# Patient Record
Sex: FEMALE | URBAN - METROPOLITAN AREA
[De-identification: names, ages, dates, MRNs, and addresses within clinical notes are randomized per-mention and may not be internally consistent; named-entity substitution may affect disease eponyms.]

---

## 2021-07-15 ENCOUNTER — LAB OUTSIDE AN ENCOUNTER (OUTPATIENT)
Dept: URBAN - METROPOLITAN AREA CLINIC 113 | Facility: CLINIC | Age: 51
End: 2021-07-15

## 2021-07-15 ENCOUNTER — TELEPHONE ENCOUNTER (OUTPATIENT)
Dept: URBAN - METROPOLITAN AREA CLINIC 113 | Facility: CLINIC | Age: 51
End: 2021-07-15

## 2021-07-15 VITALS — BODY MASS INDEX: 23.95 KG/M2 | WEIGHT: 158 LBS | HEIGHT: 68 IN

## 2021-07-15 RX ORDER — SODIUM, POTASSIUM,MAG SULFATES 17.5-3.13G
354ML SOLUTION, RECONSTITUTED, ORAL ORAL
Qty: 354 MILLILITER | Refills: 0 | OUTPATIENT
Start: 2021-07-15 | End: 2021-07-16

## 2021-07-20 ENCOUNTER — TELEPHONE ENCOUNTER (OUTPATIENT)
Dept: URBAN - METROPOLITAN AREA SURGERY CENTER 30 | Facility: SURGERY CENTER | Age: 51
End: 2021-07-20

## 2021-07-20 RX ORDER — SODIUM, POTASSIUM,MAG SULFATES 17.5-3.13G
354ML SOLUTION, RECONSTITUTED, ORAL ORAL
Qty: 354 MILLILITER | Refills: 0 | OUTPATIENT
Start: 2021-07-15 | End: 2021-07-22

## 2021-07-22 ENCOUNTER — TELEPHONE ENCOUNTER (OUTPATIENT)
Dept: URBAN - METROPOLITAN AREA SURGERY CENTER 25 | Facility: SURGERY CENTER | Age: 51
End: 2021-07-22

## 2021-07-22 RX ORDER — POLYETHYLENE GLYCOL 3350, SODIUM SULFATE ANHYDROUS, SODIUM BICARBONATE, SODIUM CHLORIDE, POTASSIUM CHLORIDE 236; 22.74; 6.74; 5.86; 2.97 G/4L; G/4L; G/4L; G/4L; G/4L
AS DIRECTED POWDER, FOR SOLUTION ORAL ONCE
Qty: 236 GM | Refills: 0 | OUTPATIENT
Start: 2021-07-22 | End: 2021-07-23

## 2021-07-23 ENCOUNTER — OFFICE VISIT (OUTPATIENT)
Dept: URBAN - METROPOLITAN AREA SURGERY CENTER 25 | Facility: SURGERY CENTER | Age: 51
End: 2021-07-23
Payer: COMMERCIAL

## 2021-07-23 DIAGNOSIS — K63.5 BENIGN COLON POLYP: ICD-10-CM

## 2021-07-23 DIAGNOSIS — Z12.11 COLON CANCER SCREENING: ICD-10-CM

## 2021-07-23 PROCEDURE — G8907 PT DOC NO EVENTS ON DISCHARG: HCPCS | Performed by: INTERNAL MEDICINE

## 2021-07-23 PROCEDURE — 45380 COLONOSCOPY AND BIOPSY: CPT | Performed by: INTERNAL MEDICINE

## 2021-07-23 RX ORDER — POLYETHYLENE GLYCOL 3350, SODIUM SULFATE ANHYDROUS, SODIUM BICARBONATE, SODIUM CHLORIDE, POTASSIUM CHLORIDE 236; 22.74; 6.74; 5.86; 2.97 G/4L; G/4L; G/4L; G/4L; G/4L
AS DIRECTED POWDER, FOR SOLUTION ORAL ONCE
Qty: 236 GM | Refills: 0 | Status: ACTIVE | COMMUNITY
Start: 2021-07-22 | End: 2021-07-23

## 2021-08-06 ENCOUNTER — OFFICE VISIT (OUTPATIENT)
Dept: URBAN - METROPOLITAN AREA CLINIC 113 | Facility: CLINIC | Age: 51
End: 2021-08-06

## 2021-08-06 NOTE — HPI-TODAY'S VISIT:
Ms. Ortiz is a 51-year-old woman with no chronic conditions, presenting for follow-up after undergoing colonoscopy. Colonoscopy (7/23/2021): [place] bowel preparation scale score of nine.  Colonoscopy was performed without difficulty.  Normal examined portion of the terminal ileum. One 2 mm polyp in the mid ascending colon, removed with cold biopsy forceps.  Pathology revealed colonic mucosa with focal granular hyperplasia, benign lymphoid aggregate.  No adenomatous glands identified negative for dysplasia or malignancy.  Repeat colonoscopy in 10 years for surveillance purposes.

## 2022-03-22 ENCOUNTER — OFFICE VISIT (OUTPATIENT)
Dept: URBAN - METROPOLITAN AREA CLINIC 113 | Facility: CLINIC | Age: 52
End: 2022-03-22
Payer: COMMERCIAL

## 2022-03-22 ENCOUNTER — WEB ENCOUNTER (OUTPATIENT)
Dept: URBAN - METROPOLITAN AREA CLINIC 113 | Facility: CLINIC | Age: 52
End: 2022-03-22

## 2022-03-22 ENCOUNTER — DASHBOARD ENCOUNTERS (OUTPATIENT)
Age: 52
End: 2022-03-22

## 2022-03-22 VITALS
SYSTOLIC BLOOD PRESSURE: 101 MMHG | WEIGHT: 164 LBS | TEMPERATURE: 98.2 F | DIASTOLIC BLOOD PRESSURE: 64 MMHG | BODY MASS INDEX: 24.86 KG/M2 | HEIGHT: 68 IN | HEART RATE: 81 BPM

## 2022-03-22 DIAGNOSIS — D18.03 LIVER HEMANGIOMA: ICD-10-CM

## 2022-03-22 DIAGNOSIS — Z12.11 COLON CANCER SCREENING: ICD-10-CM

## 2022-03-22 PROBLEM — 305058001: Status: ACTIVE | Noted: 2022-03-21

## 2022-03-22 PROCEDURE — 99213 OFFICE O/P EST LOW 20 MIN: CPT | Performed by: INTERNAL MEDICINE

## 2022-03-22 RX ORDER — ALBUTEROL SULFATE 90 UG/1
1 PUFF AS NEEDED AEROSOL, METERED RESPIRATORY (INHALATION)
Status: ACTIVE | COMMUNITY

## 2022-03-22 NOTE — HPI-OTHER HISTORIES
Colonoscopy by Dr. Moore on 7/23/2021 revealed a normal terminal ileum and a 2 mm polyp in the mid ascending colon.  This was hyperplastic.

## 2022-03-22 NOTE — HPI-TODAY'S VISIT:
52-year-old with a history of COVID-19 on 1/7/2022.  She developed a persistent cough and she had a chest x-ray 127/22 that was unremarkable.  She had a CT angiogram the same day that revealed no pulmonary embolus or infectious lung lesion but there were hypodense liver lesions.  She subsequently had an MRI of the liver with and without contrast on 2/6/2022 that revealed multiple benign cavernous hemangiomas of the liver largest being 1.4 cm in the left lobe of the liver.  There are no suspicious liver lesions.  The common bile duct was normal as was the pancreas and spleen.  3/22/22: Her PCP is Dr. Lizz Champagne, who recommended further evaluation of the liver lesions. She is asymptomatic from a GI standpoint. No dysphagia, heartburn, abdominal pain, nausea or vomiting. No jaundice or icterus. No weight loss. No dark urine. She tells me that her bowels are moving at her baseline, which is every 2 days or so. There is no blood per rectum.

## 2022-03-23 LAB
ALBUMIN: 4.5
ALKALINE PHOSPHATASE: 91
ALT (SGPT): 18
AST (SGOT): 22
BILIRUBIN, DIRECT: 0.17
BILIRUBIN, TOTAL: 0.8
PROTEIN, TOTAL: 6.9

## 2022-04-21 ENCOUNTER — OFFICE VISIT (OUTPATIENT)
Dept: URBAN - METROPOLITAN AREA CLINIC 113 | Facility: CLINIC | Age: 52
End: 2022-04-21